# Patient Record
(demographics unavailable — no encounter records)

---

## 2025-03-06 NOTE — PHYSICAL EXAM
[General Appearance - Alert] : alert [General Appearance - In No Acute Distress] : in no acute distress [Oriented To Time, Place, And Person] : oriented to person, place, and time [Motor Tone] : muscle tone was normal in all four extremities [Motor Strength] : muscle strength was normal in all four extremities [Sensation Tactile Decrease] : light touch was intact [Abnormal Walk] : normal gait [Balance] : balance was intact

## 2025-03-11 NOTE — ASSESSMENT
[FreeTextEntry1] : 35 y/o F, status post C5-6 ACDF on 2/3/23 for a herniated disc with myelopathy. She did well with improvement in her hand  and back to full strength. Presents for a year follow up with updated x-rays.    Please see Dr. Rae's dictation for details. I, Dr. Annie Rae evaluated the patient with the PA Devin Mosher and established the plan of care. I personally discuss this patient with the PA at the time of the visit. I agree with the assessment and plan as written, unless noted below.

## 2025-03-11 NOTE — HISTORY OF PRESENT ILLNESS
[FreeTextEntry1] : 34-year-old female who underwent C5-6 ACDF on 2/3/23 for a ruptured disc with myelopathy. She did well with improvement in her hand  and back to full strength.  Presents for a year follow up with updated x-rays.

## 2025-03-11 NOTE — ASSESSMENT
[FreeTextEntry1] : 33 y/o F, status post C5-6 ACDF on 2/3/23 for a herniated disc with myelopathy. She did well with improvement in her hand  and back to full strength. Presents for a year follow up with updated x-rays.    Please see Dr. Rae's dictation for details. I, Dr. Annie Rae evaluated the patient with the PA Devin Mosher and established the plan of care. I personally discuss this patient with the PA at the time of the visit. I agree with the assessment and plan as written, unless noted below.